# Patient Record
Sex: MALE | Race: WHITE
[De-identification: names, ages, dates, MRNs, and addresses within clinical notes are randomized per-mention and may not be internally consistent; named-entity substitution may affect disease eponyms.]

---

## 2019-01-03 ENCOUNTER — HOSPITAL ENCOUNTER (OUTPATIENT)
Dept: HOSPITAL 95 - ER | Age: 59
Setting detail: OBSERVATION
LOS: 2 days | Discharge: HOME | End: 2019-01-05
Attending: HOSPITALIST | Admitting: HOSPITALIST
Payer: COMMERCIAL

## 2019-01-03 VITALS — WEIGHT: 215.83 LBS | BODY MASS INDEX: 30.9 KG/M2 | HEIGHT: 70 IN

## 2019-01-03 DIAGNOSIS — Z88.2: ICD-10-CM

## 2019-01-03 DIAGNOSIS — T78.3XXA: Primary | ICD-10-CM

## 2019-01-03 DIAGNOSIS — F43.9: ICD-10-CM

## 2019-01-03 DIAGNOSIS — Z79.899: ICD-10-CM

## 2019-01-03 DIAGNOSIS — T38.0X5A: ICD-10-CM

## 2019-01-03 DIAGNOSIS — R00.0: ICD-10-CM

## 2019-01-03 DIAGNOSIS — D72.829: ICD-10-CM

## 2019-01-03 DIAGNOSIS — I10: ICD-10-CM

## 2019-01-03 LAB
ANION GAP SERPL CALCULATED.4IONS-SCNC: 6 MMOL/L (ref 6–16)
BASOPHILS # BLD AUTO: 0.11 K/MM3 (ref 0–0.23)
BASOPHILS NFR BLD AUTO: 1 % (ref 0–2)
BUN SERPL-MCNC: 18 MG/DL (ref 8–24)
CALCIUM SERPL-MCNC: 8.6 MG/DL (ref 8.5–10.1)
CHLORIDE SERPL-SCNC: 102 MMOL/L (ref 98–108)
CO2 SERPL-SCNC: 27 MMOL/L (ref 21–32)
CREAT SERPL-MCNC: 1.07 MG/DL (ref 0.6–1.2)
DEPRECATED RDW RBC AUTO: 38.8 FL (ref 35.1–46.3)
EOSINOPHIL # BLD AUTO: 0.17 K/MM3 (ref 0–0.68)
EOSINOPHIL NFR BLD AUTO: 2 % (ref 0–6)
ERYTHROCYTE [DISTWIDTH] IN BLOOD BY AUTOMATED COUNT: 12 % (ref 11.7–14.2)
GLUCOSE SERPL-MCNC: 121 MG/DL (ref 70–99)
HCT VFR BLD AUTO: 48 % (ref 37–53)
HGB BLD-MCNC: 16.5 G/DL (ref 13.5–17.5)
IMM GRANULOCYTES # BLD AUTO: 0.04 K/MM3 (ref 0–0.1)
IMM GRANULOCYTES NFR BLD AUTO: 0 % (ref 0–1)
LYMPHOCYTES # BLD AUTO: 4.05 K/MM3 (ref 0.84–5.2)
LYMPHOCYTES NFR BLD AUTO: 36 % (ref 21–46)
MCHC RBC AUTO-ENTMCNC: 34.4 G/DL (ref 31.5–36.5)
MCV RBC AUTO: 87 FL (ref 80–100)
MONOCYTES # BLD AUTO: 0.94 K/MM3 (ref 0.16–1.47)
MONOCYTES NFR BLD AUTO: 8 % (ref 4–13)
NEUTROPHILS # BLD AUTO: 5.89 K/MM3 (ref 1.96–9.15)
NEUTROPHILS NFR BLD AUTO: 53 % (ref 41–73)
NRBC # BLD AUTO: 0 K/MM3 (ref 0–0.02)
NRBC BLD AUTO-RTO: 0 /100 WBC (ref 0–0.2)
PLATELET # BLD AUTO: 209 K/MM3 (ref 150–400)
POTASSIUM SERPL-SCNC: 4.1 MMOL/L (ref 3.5–5.5)
SODIUM SERPL-SCNC: 135 MMOL/L (ref 136–145)

## 2019-01-03 PROCEDURE — G0378 HOSPITAL OBSERVATION PER HR: HCPCS

## 2019-01-03 NOTE — NUR
ASSUMING CARE OF PT AT THIS TIME. PT REPORT RECEIVED VIA TELEPHONE WITH
OFFGOING NURSE, HELENA DESIR. WAITING FOR PT TRANSFER FROM ED TO ICU AT THIS
TIME.

## 2019-01-03 NOTE — NUR
PHARMACY
 
PER EMAR AND REPORT FROM OFFGOING ED NURSE, HELENA RN - BENADRYL, PEPCID,
AND SOLUMEDROL ADMINISTERED IN ED AT 2200.  WINIFRED, PHARMACIST INSTRUCTED TO
ADMINISTER 0000 BENADRYL, BUT WINIFRED IS PLANNING TO CHANGE SOLUMEDROL SCHEDULED
TIMES

## 2019-01-04 LAB
ALBUMIN SERPL BCP-MCNC: 4.3 G/DL (ref 3.4–5)
ALBUMIN/GLOB SERPL: 1.3 {RATIO} (ref 0.8–1.8)
ALT SERPL W P-5'-P-CCNC: 69 U/L (ref 12–78)
ANION GAP SERPL CALCULATED.4IONS-SCNC: 7 MMOL/L (ref 6–16)
AST SERPL W P-5'-P-CCNC: 32 U/L (ref 12–37)
BILIRUB SERPL-MCNC: 0.5 MG/DL (ref 0.1–1)
BUN SERPL-MCNC: 17 MG/DL (ref 8–24)
CALCIUM SERPL-MCNC: 8.9 MG/DL (ref 8.5–10.1)
CHLORIDE SERPL-SCNC: 107 MMOL/L (ref 98–108)
CO2 SERPL-SCNC: 25 MMOL/L (ref 21–32)
CREAT SERPL-MCNC: 0.92 MG/DL (ref 0.6–1.2)
DEPRECATED RDW RBC AUTO: 37.8 FL (ref 35.1–46.3)
ERYTHROCYTE [DISTWIDTH] IN BLOOD BY AUTOMATED COUNT: 12 % (ref 11.7–14.2)
GLOBULIN SER CALC-MCNC: 3.2 G/DL (ref 2.2–4)
GLUCOSE SERPL-MCNC: 160 MG/DL (ref 70–99)
HCT VFR BLD AUTO: 47.6 % (ref 37–53)
HGB BLD-MCNC: 16.4 G/DL (ref 13.5–17.5)
MCHC RBC AUTO-ENTMCNC: 34.5 G/DL (ref 31.5–36.5)
MCV RBC AUTO: 86 FL (ref 80–100)
NRBC # BLD AUTO: 0 K/MM3 (ref 0–0.02)
NRBC BLD AUTO-RTO: 0 /100 WBC (ref 0–0.2)
PLATELET # BLD AUTO: 194 K/MM3 (ref 150–400)
POTASSIUM SERPL-SCNC: 3.9 MMOL/L (ref 3.5–5.5)
PROT SERPL-MCNC: 7.5 G/DL (ref 6.4–8.2)
SODIUM SERPL-SCNC: 139 MMOL/L (ref 136–145)

## 2019-01-04 NOTE — NUR
DR. GOODMAN
 
SBP REMAINS 170'S. PT ANXIOUS. INFORMED DR. GOODMAN OF VS (SEE VS FS) AND
ANXIETY. HYDRALAZINE ADMINSITERED AT 2[address] IN ED[address] GOODMAN INSTRUCTED TO
ADMINSITER HYDRALAZINE AT THIS TIME AND ATIVAN 0.5 MG PO - WAITING FOR
VERIFICATION OF MEDICATION FROM PHARMACY AT THIS TIME.

## 2019-01-04 NOTE — NUR
PATIENT'S WIFE AT BEDSIDE, UPDATE PROVIDED AND MEDICATION LIST REVIEWED AND
SUPPLEMENTS ADDED, PATIENT CONTINUES TO BE NPO AT THIS TIME, CALL LIGHT IN
REACH, WILL CONTINUE TO MONITOR.

## 2019-01-04 NOTE — NUR
REPORT CALLED TO KARLEE CORDON, PATIENT WILL BE TRANSFERRED TO [facility] VIA WHEELCHAIR.

## 2019-01-04 NOTE — NUR
PATIENT WAS UPDATED ON DR. KEE'S RESPONSE, WIFE AND DAUGHTER IN ROOM,
PATIENT WAS ALSO PROVIDED WITH THE OPTION OF LEAVING AMA BUT ACCORDING TO WIFE
AND DAUGHTER THAT IS NOT AN OPTION AT THIS TIME, PATIENT IS PLEASANT OTHERWISE
AND RESTING COMFORTABLY, CALL LIGHT IN REACH, WILL CONTINUE TO MONITOR.

## 2019-01-04 NOTE — NUR
PT WALKED TO ROOM ESCORTED BY PATIENT CARE TECH FROM ICU AND WIFE. PT  A/O X
4, VSS, SITTING UP AT BEDSIDE EATING DINNER. WIFE IN ROOM PT STATES NO PAIN AT
THIS TIME. PT PLEASANT/COOPERATIVE

## 2019-01-04 NOTE — NUR
ASSESSMENT
 
PT ANXIOUS, COOPERATIVE, RESPONDS TO VERBAL STIMULI, SPONT OPENS EYES, TALKS
AND ANSWERS QUESTIONS APPROPRIATELY, FREQUENTLY REPEATS QUESTIONS. SENSATION
INTACT. PT DENIES N/T. PT LOWERY. NO WEAKNESS NOTED. PT AMBULATES INDEPENDENTLY
WITHIN ROOM.  ASSIST WITH LINES/CORDS. PT DENIES PAIN/DISCOMFORT AT THIS TIME.
PT STATES, "MY TONGUE IS LESS SWOLLEN AT THIS TIME".
 
LUNGS CLEAR. PT ON RA. OXY SAT >90%. RR RR. DENIES SOB. NO COUGHING. AFEBRILE.
NSR TO ST. HR 90'S 'S. 'S. HYDRALAZINE ADMINSITERED IN ED.
STRONG PUSLES. WARM, PINK SKIN. ACTIVE BT X4 QUADRANTS. AOD SOFT, NONTENDER.
MILD DIST (PT STATES DISTENTION IS NORMAL). NO N/V. NO BM. PT NPO D/T
ANGIOEDEMA. PT HAS BATHROOM PRIVELEGES. NO UO AT THIS TIME. PIV X1 - SL.

## 2019-01-04 NOTE — NUR
PEPCID AND PREDNISONE GIVEN IV THIS AM, DR. KEE CHANGED MEDS TO PO AND
THEY SHOWED ON EMAR AGAIN, LEEANNA, PHARMACIST, NOTIFIED, AND WILL CHANGE TIMES.

## 2019-01-04 NOTE — NUR
PATIENT AMBULATED TO ROOM 211 ACCOMPANIED BY LUKE, CNA, AND WIFE, ALL
BELONGINGS AND CHART WITH PATIENT.

## 2019-01-04 NOTE — NUR
STARTF SHIFT NOTE: RECEIVED REPORT FROM KARLEE NEWMAN, PATIENT IS A+Ox4, DENIES
PAIN OR SHORTNESS OF BREATH, REPORTS NO CHEST PAIN/DISCOMFORT, PATIENT STATES
"I CAN SWALLOW AGAIN AND MY TONGUE IS NOT SWOLLEN ANYMORE", VSS, AFEBRILE,
INDEPENDENT IN ROOM, DR. KEE AT BEDSIDE, CALL LIGHT IN REACH, WILL
CONTINUE TO MONITOR.

## 2019-01-04 NOTE — NUR
SHIFT ASSESSMENT
 
NO ACUTE CHANGES NOTED T/O SHIFT. PT SLEPT T/O SHIFT. NO ANXIETY NOTED AFTER
ADMINISTERING ATIVAN. OTHERWISE PT CALM, QUIET, COOPERATIVE, RESPODNS TO
VERBAL STIMULI, SPONT OPENS EYES, TALKS AND ANSWERS QUESTIONS APPROPRIATELY.
SENSATION INTACT. PT DENIES N/T. PT LOWERY. NO WEAKNESS NOTED. PT AMBULATES
INDEPENDENTLY WITHOUT ROOM. NURSE ASSISTS WITH LINES/CORDS. PT DNEIES
PAIN/DISCOMFORT T/O SHIFT. NO SWELLING OF TONGUE NOTED.
 
LUNGS CLEAR. PT ON RA. OXY SAT 90% AND GREATER. RR 11 TO 20'S. DENIES SOB. NO
COUGHING. AFEBRILE. NSR TO ST. HR 90'S 'S. BP STABLE AT THIS TIME - SEE
VS FS. HYDRALAZINE ADMINISTERED DURING SHIFT FOR HYPERTENSION. STRONG PULSES.
WARM, PINK SKIN. ACTIVE BT X4 QUADRANTS. ABD SOFT, NONTENDER, MID DIST. NO
N/V. NO BM. PT NPO D/T ANGIOEDEMA. PT HAS BATHROOM PRIVELEGES - YELLOW, CLEAR
UREINNTOED. PIV X1 - SL.
 
WILL CONT TO MONITOR PT AND WILL PROVIDE BEDSIDE REPORT TO ONCOMING NURSE THIS
AM.

## 2019-01-04 NOTE — NUR
CALLED DR. KEE TO UPDATE ON PATIENT STATUS, PATIENT STATED "PLEASE CALL
THE DOCTOR AND LET HER KNOW THAT I WANT TO DRINK AND EAT SOMETHING AND I WANT
TO GO HOME", DIET WAS ORDERED, PATIENT TO REMAIN IN HOSPITAL ONE MORE NIGHT AS
PER DR. KEE.

## 2019-01-05 NOTE — NUR
SHIFT SUMMARY:
 
PT HAS DONE WELL THIS SHIFT. SENSATION INTACT. NEGATIVE FOR SWELLING OR ANY
NUMBNESS/TINGLING. GIVEN BENADRYL PER EMAR. MALI DIET. VOIDING. IND IN ROOM.
SLAINE LOCKED. PLAN IS FOR PT TO DISCHARGE TODAY. NO CONCERNS AT THIS TIME.
CALL LIGHT IN REACH.

## 2019-01-05 NOTE — NUR
SHIFT SUMMARY/DC
 
PT HAS HAD NO ACUTE CHANGES THIS SHIFT, NO COMPLAINTS OF ANY KIND. REVIEWED DC
INSTRUCTIONS W/PT & SPOUSE, BOTH VERBALIZED UNDERSTANDING. EDUCATED PT ON
EPIPEN AND PREDNISONE TAPER. PT WALKED OUT TO DC IN PRIVATE VEHICLE.

## 2019-02-04 ENCOUNTER — HOSPITAL ENCOUNTER (OUTPATIENT)
Dept: HOSPITAL 95 - LAB EV | Age: 59
End: 2019-02-04
Attending: EMERGENCY MEDICINE
Payer: COMMERCIAL

## 2019-02-04 DIAGNOSIS — N39.0: Primary | ICD-10-CM

## 2025-05-08 ENCOUNTER — HOSPITAL ENCOUNTER (OUTPATIENT)
Dept: HOSPITAL 95 - LAB SHORT | Age: 65
End: 2025-05-08
Payer: COMMERCIAL

## 2025-05-08 DIAGNOSIS — R30.0: Primary | ICD-10-CM
